# Patient Record
Sex: MALE | Race: OTHER
[De-identification: names, ages, dates, MRNs, and addresses within clinical notes are randomized per-mention and may not be internally consistent; named-entity substitution may affect disease eponyms.]

---

## 2018-11-14 ENCOUNTER — HOSPITAL ENCOUNTER (EMERGENCY)
Dept: HOSPITAL 72 - EMR | Age: 43
Discharge: HOME | End: 2018-11-14
Payer: MEDICAID

## 2018-11-14 VITALS — SYSTOLIC BLOOD PRESSURE: 130 MMHG | DIASTOLIC BLOOD PRESSURE: 75 MMHG

## 2018-11-14 VITALS — WEIGHT: 182 LBS | HEIGHT: 73 IN | BODY MASS INDEX: 24.12 KG/M2

## 2018-11-14 VITALS — SYSTOLIC BLOOD PRESSURE: 128 MMHG | DIASTOLIC BLOOD PRESSURE: 73 MMHG

## 2018-11-14 DIAGNOSIS — R07.9: Primary | ICD-10-CM

## 2018-11-14 DIAGNOSIS — I10: ICD-10-CM

## 2018-11-14 DIAGNOSIS — E11.9: ICD-10-CM

## 2018-11-14 DIAGNOSIS — F17.200: ICD-10-CM

## 2018-11-14 DIAGNOSIS — R00.0: ICD-10-CM

## 2018-11-14 DIAGNOSIS — F41.9: ICD-10-CM

## 2018-11-14 PROCEDURE — 93005 ELECTROCARDIOGRAM TRACING: CPT

## 2018-11-14 PROCEDURE — 84484 ASSAY OF TROPONIN QUANT: CPT

## 2018-11-14 PROCEDURE — 99283 EMERGENCY DEPT VISIT LOW MDM: CPT

## 2018-11-14 NOTE — EMERGENCY ROOM REPORT
History of Present Illness


General


Chief Complaint:  Chest Pain


Source:  Patient





Present Illness


HPI


This is a 43-year-old male with history of anxiety.  He presents with chief 

complaint of anxiety.  Also complaining of chest pain with anxiety.  He is out 

of his by him.  He claimed that he has prescription of Barrientos by at [facility] 

but is in [place].  He was here earlier for the same thing.  He left AMA 

because he had to go to smoke.  I saw him with his luggage walking to the bus 

stop when I came in.  He then call 911.  Complaining of anxiety.  Said he 

needed some Ativan.  Denies any fever chills but no nausea no vomiting.  

Nothing made it better.  Nothing made it worse.  Right now has no pain. He was 

at [facility] earlier today.  Before that, yesterday he was at an ER in [facility].  Patient had multiple different hospital stickers on his luggage.


Allergies:  


Coded Allergies:  


     No Known Allergies (Unverified , 11/14/18)





Patient History


Past Medical History:  see triage record, old chart reviewed, psych hx, HIV


Past Surgical History:  other


Pertinent Family History:  none


Social History:  Denies: smoking


Immunizations:  other


Reviewed Nursing Documentation:  PMH: Agreed; PSxH: Agreed





Nursing Documentation-PMH


Past Medical History:  No History, Except For


Hx Hypertension:  Yes


Hx Diabetes:  Yes





Review of Systems


Eye:  Denies: eye pain, blurred vision


ENT:  Denies: ear pain, nose congestion, throat swelling


Respiratory:  Denies: cough, shortness of breath


Cardiovascular:  Reports: chest pain; Denies: palpitations


Gastrointestinal:  Denies: abdominal pain, diarrhea, nausea, vomiting


Musculoskeletal:  Denies: back pain, joint pain


Skin:  Denies: rash


Neurological:  Denies: headache, numbness


Endocrine:  Denies: increased thirst, increased urine


Hematologic/Lymphatic:  Denies: easy bruising


All Other Systems:  negative except mentioned in HPI





Physical Exam





Vital Signs








  Date Time  Temp Pulse Resp B/P (MAP) Pulse Ox O2 Delivery O2 Flow Rate FiO2


 


11/14/18 21:40 98.8 110 16 146/94 100 Room Air  





vitals with tachycardia


Sp02 EP Interpretation:  reviewed, normal


General Appearance:  well appearing, no apparent distress, alert


Head:  normocephalic, atraumatic


Eyes:  bilateral eye PERRL, bilateral eye EOMI


ENT:  hearing grossly normal, normal pharynx


Neck:  full range of motion, supple, no meningismus


Respiratory:  chest non-tender, lungs clear, normal breath sounds


Cardiovascular #1:  regular rate, rhythm, no murmur


Gastrointestinal:  normal bowel sounds, non tender, no mass, no organomegaly, 

no bruit, non-distended


Musculoskeletal:  back normal, gait/station normal, normal range of motion


Psychiatric:  mood/affect normal


Skin:  warm/dry





Medical Decision Making


Diagnostic Impression:  


 Primary Impression:  


 Anxiety


 Additional Impression:  


 Chest pain


 Qualified Codes:  R07.9 - Chest pain, unspecified


ER Course


Patient with chest pain.  He's been to multiple hospitals for the same.  His x-

ray sleeping without any issue here.  Heart rate less than 100.  No evidence of 

ACS, PE, dissection to name a few.  He has no fever here. I told patient I am 

uncomfortable prescribing him Byam since he get 90 tablets every month.  Told 

him to go  his prescription. We'll discharge home.


EKG Diagnostic Results


Rate:  tachycardiac


Rhythm:  NSR


ST Segments:  no acute changes





Rhythm Strip Diag. Results


Rhythm Strip Time:  21:58


EP Interpretation:  yes


Rate:  100


Rhythm:  NSR, no PVC's, no ectopy





Last Vital Signs








  Date Time  Temp Pulse Resp B/P (MAP) Pulse Ox O2 Delivery O2 Flow Rate FiO2


 


11/14/18 21:40 98.8 110 16 146/94 100 Room Air  








Status:  improved


Disposition:  HOME, SELF-CARE


Condition:  Stable


Patient Instructions:  Nonspecific Chest Pain





Additional Instructions:  


Follow-up with your doctor in 7 days.  Return if symptom worsen.











Steven Barcenas MD Nov 14, 2018 21:58

## 2018-11-16 NOTE — CARDIOLOGY REPORT
--------------- APPROVED REPORT --------------





EKG Measurement

Heart [id number]

[place] [id number]

[id number]

[id number]

[id number]





Sinus tachycardia with premature atrial complexes with aberrant conduction

T wave abnormality, consider inferior ischemia

Abnormal ECG